# Patient Record
Sex: FEMALE | Race: ASIAN | NOT HISPANIC OR LATINO | Employment: STUDENT | URBAN - METROPOLITAN AREA
[De-identification: names, ages, dates, MRNs, and addresses within clinical notes are randomized per-mention and may not be internally consistent; named-entity substitution may affect disease eponyms.]

---
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
## 2024-01-03 NOTE — PROGRESS NOTES
"     Art Therapy Assessment       12/20/17 0900   General Information   Art Directive house-tree-person   Diagnosis see DA   Reason for referral see DA   Task Orientation    Task orientation skills calm and focused;follows instruction;works independently;able to concentrate;confident in choices   Task orientation concerns concerned about mistakes   Social Interaction   Social interaction skills active participation;responds to therapist;makes eye contact;asks for help   Social interaction concerns other (see comments)  (no concerns at this time)   Product/Content   Product/Content image reflects current feelings;image reflects positive aspects;devalues product;presence of a metaphor/theme;stereotypical image   Developmental level   Approximate developmental level of art expression age appropriate expression;age appropriate motor skills   Summary   Summary see note       Pt has cooperatively attended and participated in art therapy group sessions. Pt complied minimally with initial drawing directive and chose to continue working with drawing and coloring materials when offered choices. Pt stated her mood was at a \"3\" her first day in art and \"pretty great, at a 7.5\" next time she was seen by this art therapist. Pt said she mostly enjoys \"word art\" and \"lettering\". She appears to be inspired by anime and manga-style figure drawing. She has creatively written out some favorite lyrics to songs by Joann Márquez and Kole Kennedy and twice drawn an image of two people embracing. She demonstrates developing good skill at figure drawing and creative lettering though she is very self-critical and does not express pride in her own work. Pt seemed quiet yet connected and communicative within her small peer group.     Pt will continue to be engaged in art therapy group sessions while attending this outpatient program. Pt will be encouraged to use art media for creative self-expression and as a positive coping skill to help manage " emotions, improve functioning and help reduce symptoms of anxiety and depression.     Art Therapist has completed this initial assessment and reviewed treatment plan.   Sheila Castellano MA, ATR  Art Therapist    Schizophrenia, unspecified type